# Patient Record
Sex: FEMALE | Race: OTHER | URBAN - METROPOLITAN AREA
[De-identification: names, ages, dates, MRNs, and addresses within clinical notes are randomized per-mention and may not be internally consistent; named-entity substitution may affect disease eponyms.]

---

## 2018-09-25 ENCOUNTER — TELEPHONE (OUTPATIENT)
Dept: ORTHOPEDIC SURGERY | Age: 53
End: 2018-09-25

## 2018-09-25 ENCOUNTER — OFFICE VISIT (OUTPATIENT)
Dept: ORTHOPEDIC SURGERY | Age: 53
End: 2018-09-25
Payer: COMMERCIAL

## 2018-09-25 VITALS
HEIGHT: 63 IN | BODY MASS INDEX: 31.33 KG/M2 | WEIGHT: 176.8 LBS | SYSTOLIC BLOOD PRESSURE: 127 MMHG | HEART RATE: 69 BPM | DIASTOLIC BLOOD PRESSURE: 75 MMHG | TEMPERATURE: 97.9 F

## 2018-09-25 DIAGNOSIS — R22.41 MASS OF RIGHT THIGH: ICD-10-CM

## 2018-09-25 DIAGNOSIS — M25.551 PAIN OF RIGHT HIP JOINT: Primary | ICD-10-CM

## 2018-09-25 PROCEDURE — 99202 OFFICE O/P NEW SF 15 MIN: CPT | Performed by: PHYSICIAN ASSISTANT

## 2018-09-25 PROCEDURE — G8417 CALC BMI ABV UP PARAM F/U: HCPCS | Performed by: PHYSICIAN ASSISTANT

## 2018-09-25 PROCEDURE — 1036F TOBACCO NON-USER: CPT | Performed by: PHYSICIAN ASSISTANT

## 2018-09-25 PROCEDURE — G8427 DOCREV CUR MEDS BY ELIG CLIN: HCPCS | Performed by: PHYSICIAN ASSISTANT

## 2018-09-25 PROCEDURE — 3017F COLORECTAL CA SCREEN DOC REV: CPT | Performed by: PHYSICIAN ASSISTANT

## 2018-09-27 PROBLEM — M25.551 PAIN OF RIGHT HIP JOINT: Status: ACTIVE | Noted: 2018-09-27

## 2018-09-27 PROBLEM — R22.41 MASS OF RIGHT THIGH: Status: ACTIVE | Noted: 2018-09-27

## 2018-09-27 NOTE — PROGRESS NOTES
Subjective:      Patient ID: Hina Gonzalez is a 48 y.o. female. Chief Complaint   Patient presents with    Hip Pain     right hip pain      Patient is represented by an . She does not speak [place]. HPI:   She is here for an initial evaluation of a new problem. She is having some right anterior hip and groin pain. Difficulty crossing legs. Develops muscle cramp type pain in the right anterior thigh. Symptoms may go on for last 5-6 months. No history of injury. She has been treating her symptoms with Tylenol without relief. Concerned that she has a different type of crease on her right hip anteriorly compared to the left hip anteriorly. She feels as if the right thigh is more swollen. Review Of Systems:   As outlined in the HPI. Negative for fever or chills. Negative for joint pain, swelling and stiffness. Negative for numbness or tingling. History reviewed. No pertinent past medical history. History reviewed. No pertinent family history. History reviewed. No pertinent surgical history. Social History     Occupational History    Not on file. Social History Main Topics    Smoking status: Never Smoker    Smokeless tobacco: Never Used    Alcohol use Not on file    Drug use: Unknown    Sexual activity: Not on file       Current Outpatient Prescriptions   Medication Sig Dispense Refill    vitamin D (CHOLECALCIFEROL) 1000 UNIT TABS tablet Take 1,000 Units by mouth       No current facility-administered medications for this visit. Objective:     She is alert, oriented x 3, pleasant, well nourished, developed and in no   acute distress. /75   Pulse 69   Temp 97.9 °F (36.6 °C) (Temporal)   Ht 5' 2.75\" (1.594 m)   Wt 176 lb 12.8 oz (80.2 kg)   BMI 31.57 kg/m²        Examination of the right hip shows: There is not deformity. There is not erythema. There is mild pain with internal and external rotation.   There is mild pain with flexion and